# Patient Record
Sex: FEMALE | Race: WHITE | NOT HISPANIC OR LATINO | URBAN - METROPOLITAN AREA
[De-identification: names, ages, dates, MRNs, and addresses within clinical notes are randomized per-mention and may not be internally consistent; named-entity substitution may affect disease eponyms.]

---

## 2018-12-29 ENCOUNTER — OFFICE VISIT (OUTPATIENT)
Dept: RETAIL CLINIC | Facility: CLINIC | Age: 58
End: 2018-12-29

## 2018-12-29 VITALS
RESPIRATION RATE: 16 BRPM | HEART RATE: 80 BPM | DIASTOLIC BLOOD PRESSURE: 68 MMHG | OXYGEN SATURATION: 100 % | TEMPERATURE: 100.1 F | SYSTOLIC BLOOD PRESSURE: 106 MMHG

## 2018-12-29 DIAGNOSIS — J10.1 INFLUENZA A: Primary | ICD-10-CM

## 2018-12-29 LAB
EXPIRATION DATE: ABNORMAL
FLUAV AG NPH QL: POSITIVE
FLUBV AG NPH QL: NEGATIVE
INTERNAL CONTROL: ABNORMAL
Lab: ABNORMAL

## 2018-12-29 PROCEDURE — 99213 OFFICE O/P EST LOW 20 MIN: CPT | Performed by: NURSE PRACTITIONER

## 2018-12-29 PROCEDURE — 87804 INFLUENZA ASSAY W/OPTIC: CPT | Performed by: NURSE PRACTITIONER

## 2018-12-29 RX ORDER — OSELTAMIVIR PHOSPHATE 75 MG/1
75 CAPSULE ORAL 2 TIMES DAILY
Qty: 10 CAPSULE | Refills: 0 | Status: SHIPPED | OUTPATIENT
Start: 2018-12-29 | End: 2019-01-03

## 2018-12-29 RX ORDER — ESCITALOPRAM OXALATE 5 MG/1
5 TABLET ORAL DAILY
COMMUNITY

## 2018-12-29 NOTE — PATIENT INSTRUCTIONS

## 2018-12-29 NOTE — PROGRESS NOTES
Subjective:     Katie Dickson is a 58 y.o.     Patient reports that her daughter was diagnosed with the flu yesterday      URI    This is a new problem. The current episode started in the past 7 days (thursday). The problem has been rapidly worsening. Maximum temperature: unmeasured fever, chills. Associated symptoms include congestion, coughing, ear pain, headaches, a plugged ear sensation, rhinorrhea and sinus pain. She has tried inhaler use for the symptoms. The treatment provided no relief.         The following portions of the patient's history were reviewed and updated as appropriate: allergies, current medications, past family history, past medical history, past social history, past surgical history and problem list.      Review of Systems   Constitutional: Positive for chills, diaphoresis, fatigue and fever.   HENT: Positive for congestion, ear pain, rhinorrhea and sinus pain.    Respiratory: Positive for cough.    Cardiovascular: Negative.    Musculoskeletal: Positive for myalgias.   Neurological: Positive for headaches.         Objective:    Vitals:    12/29/18 1512   BP: 106/68   Pulse: 80   Resp: 16   Temp: 100.1 °F (37.8 °C)   SpO2: 100%       Physical Exam   Constitutional: She is oriented to person, place, and time. She appears well-developed and well-nourished. She appears ill.   HENT:   Head: Normocephalic and atraumatic.   Right Ear: Tympanic membrane is not erythematous and not bulging.   Left Ear: Tympanic membrane is not erythematous and not bulging.   Nose: Rhinorrhea present. Right sinus exhibits frontal sinus tenderness. Left sinus exhibits frontal sinus tenderness.   Mouth/Throat: Oropharynx is clear and moist and mucous membranes are normal.   Eyes: Conjunctivae are normal.   Cardiovascular: Normal rate, regular rhythm and normal heart sounds.   Pulmonary/Chest: Effort normal and breath sounds normal.   Neurological: She is alert and oriented to person, place, and time.   Skin: Skin is  warm and dry.   Psychiatric: She has a normal mood and affect. Her behavior is normal. Judgment and thought content normal.   Vitals reviewed.        Katie was seen today for uri.    Diagnoses and all orders for this visit:    Influenza A  -     POC Influenza A / B    Other orders  -     oseltamivir (TAMIFLU) 75 MG capsule; Take 1 capsule by mouth 2 (Two) Times a Day for 5 days.